# Patient Record
Sex: FEMALE | Race: BLACK OR AFRICAN AMERICAN | Employment: FULL TIME | URBAN - METROPOLITAN AREA
[De-identification: names, ages, dates, MRNs, and addresses within clinical notes are randomized per-mention and may not be internally consistent; named-entity substitution may affect disease eponyms.]

---

## 2019-03-17 ENCOUNTER — HOSPITAL ENCOUNTER (EMERGENCY)
Facility: CLINIC | Age: 24
Discharge: HOME | End: 2019-03-17
Attending: PHYSICIAN ASSISTANT | Admitting: PHYSICIAN ASSISTANT
Payer: MEDICAID

## 2019-03-17 VITALS
OXYGEN SATURATION: 98 % | HEART RATE: 92 BPM | RESPIRATION RATE: 18 BRPM | SYSTOLIC BLOOD PRESSURE: 118 MMHG | WEIGHT: 119.05 LBS | DIASTOLIC BLOOD PRESSURE: 81 MMHG | TEMPERATURE: 98.1 F

## 2019-03-17 DIAGNOSIS — D64.9 ANEMIA: ICD-10-CM

## 2019-03-17 DIAGNOSIS — Z32.02 PREGNANCY EXAMINATION OR TEST, NEGATIVE RESULT: ICD-10-CM

## 2019-03-17 LAB
ABO + RH BLD: NORMAL
ABO + RH BLD: NORMAL
ALBUMIN SERPL-MCNC: 3.8 G/DL (ref 3.4–5)
ALBUMIN UR-MCNC: NEGATIVE MG/DL
ALP SERPL-CCNC: 66 U/L (ref 40–150)
ALT SERPL W P-5'-P-CCNC: 14 U/L (ref 0–50)
ANION GAP SERPL CALCULATED.3IONS-SCNC: 6 MMOL/L (ref 3–14)
ANISOCYTOSIS BLD QL SMEAR: ABNORMAL
APPEARANCE UR: ABNORMAL
AST SERPL W P-5'-P-CCNC: 14 U/L (ref 0–45)
B-HCG SERPL-ACNC: 3 IU/L (ref 0–5)
BASOPHILS # BLD AUTO: 0 10E9/L (ref 0–0.2)
BASOPHILS NFR BLD AUTO: 0.3 %
BILIRUB SERPL-MCNC: 0.3 MG/DL (ref 0.2–1.3)
BILIRUB UR QL STRIP: NEGATIVE
BUN SERPL-MCNC: 8 MG/DL (ref 7–30)
CALCIUM SERPL-MCNC: 8.9 MG/DL (ref 8.5–10.1)
CHLORIDE SERPL-SCNC: 106 MMOL/L (ref 94–109)
CO2 SERPL-SCNC: 25 MMOL/L (ref 20–32)
COLOR UR AUTO: YELLOW
CREAT SERPL-MCNC: 0.49 MG/DL (ref 0.52–1.04)
DIFFERENTIAL METHOD BLD: ABNORMAL
EOSINOPHIL # BLD AUTO: 0.1 10E9/L (ref 0–0.7)
EOSINOPHIL NFR BLD AUTO: 0.9 %
ERYTHROCYTE [DISTWIDTH] IN BLOOD BY AUTOMATED COUNT: 18.9 % (ref 10–15)
GFR SERPL CREATININE-BSD FRML MDRD: >90 ML/MIN/{1.73_M2}
GLUCOSE SERPL-MCNC: 93 MG/DL (ref 70–99)
GLUCOSE UR STRIP-MCNC: NEGATIVE MG/DL
HCT VFR BLD AUTO: 35.8 % (ref 35–47)
HGB BLD-MCNC: 9.6 G/DL (ref 11.7–15.7)
HGB UR QL STRIP: NEGATIVE
HYPOCHROMIA BLD QL: PRESENT
IMM GRANULOCYTES # BLD: 0 10E9/L (ref 0–0.4)
IMM GRANULOCYTES NFR BLD: 0.2 %
KETONES UR STRIP-MCNC: NEGATIVE MG/DL
LEUKOCYTE ESTERASE UR QL STRIP: ABNORMAL
LYMPHOCYTES # BLD AUTO: 1.5 10E9/L (ref 0.8–5.3)
LYMPHOCYTES NFR BLD AUTO: 17.6 %
MACROCYTES BLD QL SMEAR: PRESENT
MCH RBC QN AUTO: 19.8 PG (ref 26.5–33)
MCHC RBC AUTO-ENTMCNC: 26.8 G/DL (ref 31.5–36.5)
MCV RBC AUTO: 74 FL (ref 78–100)
MONOCYTES # BLD AUTO: 0.5 10E9/L (ref 0–1.3)
MONOCYTES NFR BLD AUTO: 6.2 %
MUCOUS THREADS #/AREA URNS LPF: PRESENT /LPF
NEUTROPHILS # BLD AUTO: 6.5 10E9/L (ref 1.6–8.3)
NEUTROPHILS NFR BLD AUTO: 74.8 %
NITRATE UR QL: NEGATIVE
NRBC # BLD AUTO: 0 10*3/UL
NRBC BLD AUTO-RTO: 0 /100
OVALOCYTES BLD QL SMEAR: SLIGHT
PH UR STRIP: 6 PH (ref 5–7)
PLATELET # BLD AUTO: 404 10E9/L (ref 150–450)
PLATELET # BLD EST: ABNORMAL 10*3/UL
POTASSIUM SERPL-SCNC: 3.8 MMOL/L (ref 3.4–5.3)
PROT SERPL-MCNC: 8.5 G/DL (ref 6.8–8.8)
RBC # BLD AUTO: 4.84 10E12/L (ref 3.8–5.2)
RBC #/AREA URNS AUTO: 1 /HPF (ref 0–2)
SODIUM SERPL-SCNC: 137 MMOL/L (ref 133–144)
SOURCE: ABNORMAL
SP GR UR STRIP: 1.02 (ref 1–1.03)
SPECIMEN EXP DATE BLD: NORMAL
SQUAMOUS #/AREA URNS AUTO: 1 /HPF (ref 0–1)
UROBILINOGEN UR STRIP-MCNC: 0 MG/DL (ref 0–2)
WBC # BLD AUTO: 8.7 10E9/L (ref 4–11)
WBC #/AREA URNS AUTO: 3 /HPF (ref 0–5)

## 2019-03-17 PROCEDURE — 80053 COMPREHEN METABOLIC PANEL: CPT | Performed by: PHYSICIAN ASSISTANT

## 2019-03-17 PROCEDURE — 84702 CHORIONIC GONADOTROPIN TEST: CPT | Performed by: PHYSICIAN ASSISTANT

## 2019-03-17 PROCEDURE — 85025 COMPLETE CBC W/AUTO DIFF WBC: CPT | Performed by: PHYSICIAN ASSISTANT

## 2019-03-17 PROCEDURE — 86900 BLOOD TYPING SEROLOGIC ABO: CPT | Performed by: PHYSICIAN ASSISTANT

## 2019-03-17 PROCEDURE — 86901 BLOOD TYPING SEROLOGIC RH(D): CPT | Performed by: PHYSICIAN ASSISTANT

## 2019-03-17 PROCEDURE — 99284 EMERGENCY DEPT VISIT MOD MDM: CPT | Mod: 25

## 2019-03-17 PROCEDURE — 81001 URINALYSIS AUTO W/SCOPE: CPT | Performed by: PHYSICIAN ASSISTANT

## 2019-03-17 ASSESSMENT — ENCOUNTER SYMPTOMS
ABDOMINAL PAIN: 1
VOMITING: 0
CHILLS: 0
DYSURIA: 0
FEVER: 0
NAUSEA: 1

## 2019-03-17 NOTE — ED TRIAGE NOTES
Pt recently found out she is pregnant, wants to get US to find out how far along she is.  Denies any complaint.

## 2019-03-17 NOTE — ED AVS SNAPSHOT
[facility] Emergency Department  [address]  [facility] [address]  Phone:  [phone]  Fax:  [phone]                                    Lis Fonseca   MRN: [id number]    Department:  [facility] Emergency Department   Date of Visit:  3/17/2019           After Visit Summary Signature Page    I have received my discharge instructions, and my questions have been answered. I have discussed any challenges I see with this plan with the nurse or doctor.    ..........................................................................................................................................  Patient/Patient Representative Signature      ..........................................................................................................................................  Patient Representative Print Name and Relationship to Patient    ..................................................               ................................................  Date                                   Time    ..........................................................................................................................................  Reviewed by Signature/Title    ...................................................              ..............................................  Date                                               Time          22EPIC Rev 08/18

## 2019-03-18 NOTE — DISCHARGE INSTRUCTIONS
Your blood pregnancy test here was negative.  Follow-up with your primary care doctor in 2-3 days as needed.  Please return for any fevers, vaginal discharge, or any other concerns.  Your hemoglobin is low, but it is improved from previous values.  Continue iron supplementation.

## 2019-03-18 NOTE — ED PROVIDER NOTES
History     Chief Complaint:  Pregnancy Confirmation     HPI   Lis Fonseca is a 23 year old female who presents with her boyfriend to the ED for evaluation of pregnancy confirmation. The patient reports she had a positive home pregnancy test last week. She notes she was pregnant once before, but states it ended in elective . She states she has not been evaluated clinic in regards to this pregnancy. Her LMP was on . The patient notes she developed lower abdominal cramping, nausea, and vaginal spotting last week. Her abdominal cramping and nausea has resolved. She still has pink spotting with wiping, but denies clots. The patient denies any fever, chills, vomiting, dysuria, hematuria, vaginal discharge, concerns for STIs. There are no other concerns voiced at this time.     Allergies:  No known drug allergies    Medications:    The patient is not currently taking any prescribed medications.    Past Medical History:    The patient does not have any past pertinent medical history.    Past Surgical History:    History reviewed. No pertinent surgical history.    Family History:    History reviewed. No pertinent family history.     Social History:  Presents to ED with boyfriend      Review of Systems   Constitutional: Negative for chills and fever.   Gastrointestinal: Positive for abdominal pain (resolved) and nausea. Negative for vomiting.   Genitourinary: Positive for vaginal bleeding. Negative for dysuria.   All other systems reviewed and are negative.    Physical Exam     Patient Vitals for the past 24 hrs:   BP Temp Temp src Pulse Resp SpO2 Weight   19 1900 118/81 -- -- -- -- -- --   19 1859 -- 98.1  F (36.7  C) Temporal 92 18 98 % 54 kg (119 lb 0.8 oz)     Physical Exam  General: Resting comfortably.  Alert and oriented.   Head:  The scalp, face, and head appear normal   Eyes:  Conjunctivae and sclerae are normal    ENT:    The oropharynx is normal    Uvula is in the midline     Moist  mucous membranes.   Neck:  No lymphadenopathy  CV:  Regular rate and rhythm     Normal S1/S2    Peripheral pulses intact in all 4 extremities.  No peripheral edema  Resp:  Lungs are clear to auscultation    Non-labored    No rales or wheezing   GI:  Abdomen is soft, non-distended    Mild pelvic tenderness.    No rebound or guarding.    MS:  Normal muscular tone   Skin:  No rash or acute skin lesions noted   Neuro: Speech is normal and fluent.     Emergency Department Course     Laboratory:  ABO/Rh type & screen: O positive    HCG pregnancy blood: 3    CBC: HGB 9.6(L), o/w WNL (WBC 8.7, )  CMP: Creatinine 0.49(L), o/w WNL     UA: Leukocyte esterase trace, Mucous present, o/w Negative     Emergency Department Course:  Past medical records, nursing notes, and vitals reviewed.  1917: I performed an exam of the patient and obtained history, as documented above.    Blood drawn.    2018: I rechecked the patient. Explained findings to patient and significant other.    Findings and plan explained to the Patient and significant other. Patient discharged home with instructions regarding supportive care, medications, and reasons to return. The importance of close follow-up was reviewed.     Impression & Plan      Medical Decision Making:  Lis Fonseca is a 23 year old female who presents for evaluation of possible pregnancy. She states that her last menstrual period was on 2/26 and has had some pelvic cramping and some mild spotting that started a week ago and has now resolved. She also notes she had a positive at home pregnancy test last week. She has not seen anyone or confirmed pregnancy at any clinic. She comes here today for ultrasound to see how far along she is in pregnancy. Blood HCG was obtained and was negative at 3. Additional labs were obtained in anticipation of pregnancy and demonstrates anemia which has been a problem for the patient for some time, requiring transfusions in the past. I do not believe  this is needed at this time. Otherwise lab work is unremarkable. Patient was Rh positive and urine is normal without evidence of infection. Given there's pregnancy, I do not believe ultrasound is needed at this time as she has no complaints currently. I did offer her chlamydia and gonorrhoeae testing/pelvic exam as well, but she declined. Encouraged follow-up with primary care provider in 2-3 days for recheck. I did discuss with the patient that there is a possibility that she was pregnant a week ago, but again is not showing this today. All questions were answered prior to discharge. The patient understands and agrees to this plan.     Diagnosis:    ICD-10-CM   1. Pregnancy examination or test, negative result Z32.02   2. Anemia D64.9     Disposition: Patient discharged to home with boyfriend      Haydee Hernandez  3/17/2019   [facility] EMERGENCY DEPARTMENT    Scribe Disclosure:  I, Haydee Hernandez, am serving as a scribe at 7:17 PM on 3/17/2019 to document services personally performed by Leslie Diamond PA-C based on my observations and the provider's statements to me.        Leslie Diamond PA-C  03/17/19 [id number]